# Patient Record
Sex: FEMALE | Race: WHITE | NOT HISPANIC OR LATINO | URBAN - METROPOLITAN AREA
[De-identification: names, ages, dates, MRNs, and addresses within clinical notes are randomized per-mention and may not be internally consistent; named-entity substitution may affect disease eponyms.]

---

## 2022-06-13 ENCOUNTER — OFFICE VISIT (OUTPATIENT)
Dept: URGENT CARE | Facility: CLINIC | Age: 77
End: 2022-06-13
Payer: MEDICARE

## 2022-06-13 VITALS
HEART RATE: 94 BPM | BODY MASS INDEX: 21.52 KG/M2 | TEMPERATURE: 99 F | DIASTOLIC BLOOD PRESSURE: 92 MMHG | OXYGEN SATURATION: 96 % | SYSTOLIC BLOOD PRESSURE: 157 MMHG | RESPIRATION RATE: 20 BRPM | WEIGHT: 142 LBS | HEIGHT: 68 IN

## 2022-06-13 DIAGNOSIS — J02.9 PHARYNGITIS, UNSPECIFIED ETIOLOGY: Primary | ICD-10-CM

## 2022-06-13 PROCEDURE — 1101F PR PT FALLS ASSESS DOC 0-1 FALLS W/OUT INJ PAST YR: ICD-10-PCS | Mod: CPTII,,, | Performed by: NURSE PRACTITIONER

## 2022-06-13 PROCEDURE — 1159F PR MEDICATION LIST DOCUMENTED IN MEDICAL RECORD: ICD-10-PCS | Mod: CPTII,,, | Performed by: NURSE PRACTITIONER

## 2022-06-13 PROCEDURE — 99203 PR OFFICE/OUTPT VISIT, NEW, LEVL III, 30-44 MIN: ICD-10-PCS | Mod: 25,,, | Performed by: NURSE PRACTITIONER

## 2022-06-13 PROCEDURE — 1160F PR REVIEW ALL MEDS BY PRESCRIBER/CLIN PHARMACIST DOCUMENTED: ICD-10-PCS | Mod: CPTII,,, | Performed by: NURSE PRACTITIONER

## 2022-06-13 PROCEDURE — 3080F DIAST BP >= 90 MM HG: CPT | Mod: CPTII,,, | Performed by: NURSE PRACTITIONER

## 2022-06-13 PROCEDURE — 1160F RVW MEDS BY RX/DR IN RCRD: CPT | Mod: CPTII,,, | Performed by: NURSE PRACTITIONER

## 2022-06-13 PROCEDURE — 99203 OFFICE O/P NEW LOW 30 MIN: CPT | Mod: 25,,, | Performed by: NURSE PRACTITIONER

## 2022-06-13 PROCEDURE — 3077F SYST BP >= 140 MM HG: CPT | Mod: CPTII,,, | Performed by: NURSE PRACTITIONER

## 2022-06-13 PROCEDURE — 3077F PR MOST RECENT SYSTOLIC BLOOD PRESSURE >= 140 MM HG: ICD-10-PCS | Mod: CPTII,,, | Performed by: NURSE PRACTITIONER

## 2022-06-13 PROCEDURE — 3080F PR MOST RECENT DIASTOLIC BLOOD PRESSURE >= 90 MM HG: ICD-10-PCS | Mod: CPTII,,, | Performed by: NURSE PRACTITIONER

## 2022-06-13 PROCEDURE — 1101F PT FALLS ASSESS-DOCD LE1/YR: CPT | Mod: CPTII,,, | Performed by: NURSE PRACTITIONER

## 2022-06-13 PROCEDURE — 96372 THER/PROPH/DIAG INJ SC/IM: CPT | Mod: ,,, | Performed by: NURSE PRACTITIONER

## 2022-06-13 PROCEDURE — 96372 PR INJECTION,THERAP/PROPH/DIAG2ST, IM OR SUBCUT: ICD-10-PCS | Mod: ,,, | Performed by: NURSE PRACTITIONER

## 2022-06-13 PROCEDURE — 3288F FALL RISK ASSESSMENT DOCD: CPT | Mod: CPTII,,, | Performed by: NURSE PRACTITIONER

## 2022-06-13 PROCEDURE — 3288F PR FALLS RISK ASSESSMENT DOCUMENTED: ICD-10-PCS | Mod: CPTII,,, | Performed by: NURSE PRACTITIONER

## 2022-06-13 PROCEDURE — 1159F MED LIST DOCD IN RCRD: CPT | Mod: CPTII,,, | Performed by: NURSE PRACTITIONER

## 2022-06-13 RX ORDER — NITROGLYCERIN 0.4 MG/1
1 TABLET SUBLINGUAL
COMMUNITY

## 2022-06-13 RX ORDER — ASPIRIN 325 MG
325 TABLET, DELAYED RELEASE (ENTERIC COATED) ORAL
COMMUNITY

## 2022-06-13 RX ORDER — IBUPROFEN 100 MG/5ML
1000 SUSPENSION, ORAL (FINAL DOSE FORM) ORAL
COMMUNITY

## 2022-06-13 RX ORDER — AZITHROMYCIN 250 MG/1
TABLET, FILM COATED ORAL
Qty: 6 TABLET | Refills: 0 | Status: SHIPPED | OUTPATIENT
Start: 2022-06-13

## 2022-06-13 RX ORDER — POTASSIUM CHLORIDE 750 MG/1
1 TABLET, EXTENDED RELEASE ORAL 2 TIMES DAILY
COMMUNITY

## 2022-06-13 RX ORDER — BETAMETHASONE SODIUM PHOSPHATE AND BETAMETHASONE ACETATE 3; 3 MG/ML; MG/ML
6 INJECTION, SUSPENSION INTRA-ARTICULAR; INTRALESIONAL; INTRAMUSCULAR; SOFT TISSUE
Status: COMPLETED | OUTPATIENT
Start: 2022-06-13 | End: 2022-06-13

## 2022-06-13 RX ORDER — LANOLIN ALCOHOL/MO/W.PET/CERES
400 CREAM (GRAM) TOPICAL
COMMUNITY

## 2022-06-13 RX ORDER — PRAVASTATIN SODIUM 20 MG/1
20 TABLET ORAL DAILY
COMMUNITY
Start: 2022-05-26

## 2022-06-13 RX ORDER — SOTALOL HYDROCHLORIDE 80 MG/1
TABLET ORAL
COMMUNITY

## 2022-06-13 RX ADMIN — BETAMETHASONE SODIUM PHOSPHATE AND BETAMETHASONE ACETATE 6 MG: 3; 3 INJECTION, SUSPENSION INTRA-ARTICULAR; INTRALESIONAL; INTRAMUSCULAR; SOFT TISSUE at 07:06

## 2022-06-13 NOTE — PROGRESS NOTES
"Subjective:       Patient ID: Alisa Mishra is a 77 y.o. female.    Vitals:  height is 5' 7.5" (1.715 m) and weight is 64.4 kg (142 lb). Her oral temperature is 98.6 °F (37 °C). Her blood pressure is 157/92 (abnormal) and her pulse is 94. Her respiration is 20 and oxygen saturation is 96%.     Chief Complaint: Laryngitis (Loss of voice, headache, chest congestion, slight cough, since this morning)    This 77-year-old female presents with loss of voice, mild throat discomfort, headache, slight cough, chest congestion, since this morning, declines testing      HENT: Positive for postnasal drip and sore throat.        Objective:      Physical Exam   Constitutional: She is oriented to person, place, and time. She appears well-developed. She is cooperative.  Non-toxic appearance. She does not appear ill. No distress.   HENT:   Head: Normocephalic and atraumatic.   Ears:   Right Ear: Hearing, tympanic membrane, external ear and ear canal normal.   Left Ear: Hearing, tympanic membrane, external ear and ear canal normal.   Nose: Nose normal. No mucosal edema, rhinorrhea or nasal deformity. No epistaxis. Right sinus exhibits no maxillary sinus tenderness and no frontal sinus tenderness. Left sinus exhibits no maxillary sinus tenderness and no frontal sinus tenderness.   Mouth/Throat: Uvula is midline, oropharynx is clear and moist and mucous membranes are normal. No trismus in the jaw. Normal dentition. No uvula swelling. No oropharyngeal exudate, posterior oropharyngeal edema or posterior oropharyngeal erythema.   Eyes: Conjunctivae and lids are normal. No scleral icterus.   Neck: Trachea normal and phonation normal. Neck supple. No edema present. No erythema present. No neck rigidity present.   Cardiovascular: Normal rate, regular rhythm, normal heart sounds and normal pulses.   Pulmonary/Chest: Effort normal and breath sounds normal. No respiratory distress. She has no decreased breath sounds. She has no rhonchi. "   Abdominal: Normal appearance.   Musculoskeletal: Normal range of motion.         General: No deformity. Normal range of motion.   Neurological: She is alert and oriented to person, place, and time. She exhibits normal muscle tone. Coordination normal.   Skin: Skin is warm, dry, intact, not diaphoretic and not pale.   Psychiatric: Her speech is normal and behavior is normal. Judgment and thought content normal.   Nursing note and vitals reviewed.      patient requests cortisone injection and Z-Brendon due to laryngitis and mild throat discomfort.  Refuses POC testing  Assessment:       1. Pharyngitis, unspecified etiology          Plan:     Increase oral fluids  Warm salt water gargles as instructed  OTC Chloraseptic spray as directed  Ibuprofen or Tylenol OTC for pain as directed  Take prescription medication as directed  Follow up PCP or return here for concerns    Pharyngitis, unspecified etiology  -     betamethasone acetate-betamethasone sodium phosphate injection 6 mg  -     azithromycin (Z-BRENDON) 250 MG tablet; Take 2 tablets by mouth on day 1; Take 1 tablet by mouth on days 2-5  Dispense: 6 tablet; Refill: 0

## 2022-06-14 NOTE — PATIENT INSTRUCTIONS
Please take antibiotic to completion  Increase oral fluids  Take tylenol/motrin as needed for pain/fever.  Please follow up with your primary care provider within 2-5 days if your signs and symptoms have not resolved or worsen.   Return here for concerns

## 2022-12-05 ENCOUNTER — OFFICE VISIT (OUTPATIENT)
Dept: OPHTHALMOLOGY | Facility: CLINIC | Age: 77
End: 2022-12-05
Payer: MEDICARE

## 2022-12-05 DIAGNOSIS — H50.21 HYPERTROPIA OF RIGHT EYE: Primary | ICD-10-CM

## 2022-12-05 PROCEDURE — 1126F PR PAIN SEVERITY QUANTIFIED, NO PAIN PRESENT: ICD-10-PCS | Mod: CPTII,S$GLB,, | Performed by: OPHTHALMOLOGY

## 2022-12-05 PROCEDURE — 99203 OFFICE O/P NEW LOW 30 MIN: CPT | Mod: S$GLB,,, | Performed by: OPHTHALMOLOGY

## 2022-12-05 PROCEDURE — 1160F PR REVIEW ALL MEDS BY PRESCRIBER/CLIN PHARMACIST DOCUMENTED: ICD-10-PCS | Mod: CPTII,S$GLB,, | Performed by: OPHTHALMOLOGY

## 2022-12-05 PROCEDURE — 99999 PR PBB SHADOW E&M-EST. PATIENT-LVL II: ICD-10-PCS | Mod: PBBFAC,,, | Performed by: OPHTHALMOLOGY

## 2022-12-05 PROCEDURE — 99203 PR OFFICE/OUTPT VISIT, NEW, LEVL III, 30-44 MIN: ICD-10-PCS | Mod: S$GLB,,, | Performed by: OPHTHALMOLOGY

## 2022-12-05 PROCEDURE — 1159F PR MEDICATION LIST DOCUMENTED IN MEDICAL RECORD: ICD-10-PCS | Mod: CPTII,S$GLB,, | Performed by: OPHTHALMOLOGY

## 2022-12-05 PROCEDURE — 1126F AMNT PAIN NOTED NONE PRSNT: CPT | Mod: CPTII,S$GLB,, | Performed by: OPHTHALMOLOGY

## 2022-12-05 PROCEDURE — 1160F RVW MEDS BY RX/DR IN RCRD: CPT | Mod: CPTII,S$GLB,, | Performed by: OPHTHALMOLOGY

## 2022-12-05 PROCEDURE — 1159F MED LIST DOCD IN RCRD: CPT | Mod: CPTII,S$GLB,, | Performed by: OPHTHALMOLOGY

## 2022-12-05 PROCEDURE — 99999 PR PBB SHADOW E&M-EST. PATIENT-LVL II: CPT | Mod: PBBFAC,,, | Performed by: OPHTHALMOLOGY

## 2022-12-05 RX ORDER — METOPROLOL SUCCINATE 25 MG/1
25 TABLET, EXTENDED RELEASE ORAL
COMMUNITY

## 2022-12-05 NOTE — PROGRESS NOTES
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 Cardiologist put her on meds which side affect is   double.(Taken off of Sotalol x year)  Double(side by side) closing an eye to get around.  No eye pain or headaches.  Using only readers.    I have personally interviewed the patient, reviewed the history and   examined the patient and agree with the technician's exam.   Last edited by Russel Gutiérrez MD on 12/5/2022  2:57 PM.            Assessment /Plan     For exam results, see Encounter Report.    Hypertropia of right eye      Ms. Mishra has a right hypertropia with a pattern inconsistent with a fourth cranial nerve palsy. The increase of the deviation in up gaze suggests a restrictive process. I discussed options and Ms. Mishra agreed that a temporary 2 diopter base up left eye Fresnel prism would be a good trial. Return in one month.

## 2022-12-05 NOTE — LETTER
[facility] - [address]  [address]  [facility] [address]  Phone: [phone]  Fax: [phone]   December 5, 2022    Karen Barroso MD  [address] [address] [address] [facility] [place] [address]    Patient: Alisa Mishra   MR Number: [id number]   YOB: 1945   Date of Visit: 12/5/2022       Dear Dr. Barroso:    Thank you for referring Alisa Mishra to me for evaluation. Here is my assessment and plan of care:    Assessment/Plan    For exam results, see Encounter Report.    Hypertropia of right eye      Ms. Mishra has a right hypertropia with a pattern inconsistent with a fourth cranial nerve palsy. The increase of the deviation in up gaze suggests a restrictive process. I discussed options and Ms. Mishra agreed that a temporary 2 diopter base up left eye Fresnel prism would be a good trial. Return in one month.          Below you will find my full exam findings. If you have questions, please do not hesitate to call me. I look forward to following Ms. Alisa Mishra along with you.    Sincerely,          Russel Gutiérrez MD       CC  No Recipients             Base Eye Exam       Visual Acuity (Snellen - Linear)         Right Left    Dist sc 20/30 -2 20/25    Dist ph sc NI NI              Tonometry (Applanation, 3:23 PM)         Right Left    Pressure 13 14              Pupils         Dark Light Shape React APD    Right 4 2 Round Brisk None    Left                   Visual Fields         Right Left     Full Full              Neuro/Psych       Oriented x3: Yes    Mood/Affect: Normal              Dilation       Both eyes: 1% Mydriacyl, 2.5% Phenylephrine @ 3:24 PM                  Strabismus Exam         - - - - - -  RHT 6 - - - - - -                      RHT flick - -  - -  RHT 2 - -  - -  RHT 4                     - - - - - -  Ortho  - - - - - -                   R Tilt L Tilt   RHT 2 RHT 4                          Slit Lamp and Fundus Exam       External Exam         Right Left    External Normal  Normal              Slit Lamp Exam         Right Left    Lids/Lashes Normal Normal    Conjunctiva/Sclera White and quiet White and quiet    Cornea Clear Clear    Anterior Chamber Deep and quiet Deep and quiet    Iris Round and reactive Round and reactive    Lens Nuclear sclerosis, 1+ Cortical cataract Nuclear sclerosis, 1+ Cortical cataract    Vitreous Normal Normal              Fundus Exam         Right Left    Disc Normal Normal    C/D Ratio 0.3 0.3    Macula Normal Normal    Vessels Normal Normal    Periphery Normal Normal                  Refraction       Final Rx         Vert Prism    Right     Left 2.0 up      Type: Namitanel

## 2023-01-18 ENCOUNTER — OFFICE VISIT (OUTPATIENT)
Dept: OPHTHALMOLOGY | Facility: CLINIC | Age: 78
End: 2023-01-18
Payer: MEDICARE

## 2023-01-18 DIAGNOSIS — H50.21 HYPERTROPIA OF RIGHT EYE: Primary | ICD-10-CM

## 2023-01-18 PROCEDURE — 99999 PR PBB SHADOW E&M-EST. PATIENT-LVL III: ICD-10-PCS | Mod: PBBFAC,,, | Performed by: OPHTHALMOLOGY

## 2023-01-18 PROCEDURE — 1159F MED LIST DOCD IN RCRD: CPT | Mod: CPTII,S$GLB,, | Performed by: OPHTHALMOLOGY

## 2023-01-18 PROCEDURE — 99212 PR OFFICE/OUTPT VISIT, EST, LEVL II, 10-19 MIN: ICD-10-PCS | Mod: S$GLB,,, | Performed by: OPHTHALMOLOGY

## 2023-01-18 PROCEDURE — 1160F PR REVIEW ALL MEDS BY PRESCRIBER/CLIN PHARMACIST DOCUMENTED: ICD-10-PCS | Mod: CPTII,S$GLB,, | Performed by: OPHTHALMOLOGY

## 2023-01-18 PROCEDURE — 1160F RVW MEDS BY RX/DR IN RCRD: CPT | Mod: CPTII,S$GLB,, | Performed by: OPHTHALMOLOGY

## 2023-01-18 PROCEDURE — 1159F PR MEDICATION LIST DOCUMENTED IN MEDICAL RECORD: ICD-10-PCS | Mod: CPTII,S$GLB,, | Performed by: OPHTHALMOLOGY

## 2023-01-18 PROCEDURE — 99212 OFFICE O/P EST SF 10 MIN: CPT | Mod: S$GLB,,, | Performed by: OPHTHALMOLOGY

## 2023-01-18 PROCEDURE — 99999 PR PBB SHADOW E&M-EST. PATIENT-LVL III: CPT | Mod: PBBFAC,,, | Performed by: OPHTHALMOLOGY

## 2023-01-18 NOTE — PATIENT INSTRUCTIONS
Discussed options. She will visit an optometrist closer to home in [place] to have a pair of eyeglasses made with 2 diopter base up prism left eye. She will return to me as needed.

## 2023-01-18 NOTE — PROGRESS NOTES
HPI    DLS:12/05/2022 Brock  Patient here for 1 month follow up temporary 2 diopter base up left eye   Fresnel prism.  Pt has gotten better with prism.    I have personally interviewed the patient, reviewed the history and   examined the patient and agree with the technician's exam.   Last edited by Russel Gutiérrez MD on 1/18/2023  2:54 PM.            Assessment /Plan     For exam results, see Encounter Report.    Hypertropia of right eye      Discussed options. She will visit an optometrist closer to home in [place] to have a pair of eyeglasses made with 2 diopter base up prism left eye. She will return to me as needed.